# Patient Record
Sex: MALE | Race: WHITE
[De-identification: names, ages, dates, MRNs, and addresses within clinical notes are randomized per-mention and may not be internally consistent; named-entity substitution may affect disease eponyms.]

---

## 2022-10-21 ENCOUNTER — HOSPITAL ENCOUNTER (EMERGENCY)
Dept: HOSPITAL 12 - ER | Age: 62
Discharge: HOME | End: 2022-10-21
Payer: COMMERCIAL

## 2022-10-21 VITALS — SYSTOLIC BLOOD PRESSURE: 118 MMHG | DIASTOLIC BLOOD PRESSURE: 75 MMHG

## 2022-10-21 VITALS — BODY MASS INDEX: 29.12 KG/M2 | WEIGHT: 215 LBS | HEIGHT: 72 IN
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
## 2022-10-21 DIAGNOSIS — Z79.82: ICD-10-CM

## 2022-10-21 DIAGNOSIS — E78.00: ICD-10-CM

## 2022-10-21 DIAGNOSIS — Z79.899: ICD-10-CM
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
## 2022-10-21 DIAGNOSIS — S42.022A: Primary | ICD-10-CM

## 2022-10-21 DIAGNOSIS — S70.312A: ICD-10-CM

## 2022-10-21 DIAGNOSIS — Z87.39: ICD-10-CM

## 2022-10-21 NOTE — NUR
PT PLACED IN A SHOULDER IMMOBILZER AND PT GOWN FOR MSE. HADLEY JOHNSON. SBAR TO 
ROSHNI HAAS FOR CONTINUITY OF CARE.